# Patient Record
Sex: MALE | Race: OTHER | NOT HISPANIC OR LATINO
[De-identification: names, ages, dates, MRNs, and addresses within clinical notes are randomized per-mention and may not be internally consistent; named-entity substitution may affect disease eponyms.]

---

## 2020-01-15 PROBLEM — Z00.00 ENCOUNTER FOR PREVENTIVE HEALTH EXAMINATION: Status: ACTIVE | Noted: 2020-01-15

## 2020-01-23 ENCOUNTER — APPOINTMENT (OUTPATIENT)
Dept: ORTHOPEDIC SURGERY | Facility: CLINIC | Age: 55
End: 2020-01-23
Payer: OTHER MISCELLANEOUS

## 2020-01-23 VITALS
SYSTOLIC BLOOD PRESSURE: 145 MMHG | DIASTOLIC BLOOD PRESSURE: 99 MMHG | HEIGHT: 72 IN | HEART RATE: 101 BPM | BODY MASS INDEX: 32.51 KG/M2 | WEIGHT: 240 LBS

## 2020-01-23 DIAGNOSIS — Z02.6 ENCOUNTER FOR EXAMINATION FOR INSURANCE PURPOSES: ICD-10-CM

## 2020-01-23 DIAGNOSIS — T84.84XA PAIN DUE TO INTERNAL ORTHOPEDIC PROSTHETIC DEVICES, IMPLANTS AND GRAFTS, INITIAL ENCOUNTER: ICD-10-CM

## 2020-01-23 DIAGNOSIS — M25.552 PAIN IN LEFT HIP: ICD-10-CM

## 2020-01-23 DIAGNOSIS — Z87.81 PERSONAL HISTORY OF (HEALED) TRAUMATIC FRACTURE: ICD-10-CM

## 2020-01-23 PROCEDURE — 99203 OFFICE O/P NEW LOW 30 MIN: CPT

## 2020-01-23 PROCEDURE — 73502 X-RAY EXAM HIP UNI 2-3 VIEWS: CPT | Mod: LT

## 2020-06-25 ENCOUNTER — TRANSCRIPTION ENCOUNTER (OUTPATIENT)
Age: 55
End: 2020-06-25

## 2020-06-25 VITALS
DIASTOLIC BLOOD PRESSURE: 99 MMHG | WEIGHT: 262.13 LBS | OXYGEN SATURATION: 98 % | TEMPERATURE: 98 F | SYSTOLIC BLOOD PRESSURE: 152 MMHG | HEART RATE: 83 BPM | RESPIRATION RATE: 16 BRPM

## 2020-06-25 NOTE — H&P ADULT - NSHPPHYSICALEXAM_GEN_ALL_CORE
GENERAL:  PE:  Decreased ROM secondary to pain. Rest of PE per medical clearance. Gen: 55 y/o male, well nourished, well developed, NAD  MSK: Decreased ROM secondary to pain at the left hip   calves soft, nontender bilaterally   sensation intact to light touch bilateral lower extremities  DP 2+,  brisk capillary refill  EHL/TA 4/5, FHL/GS 4+/5 LLE  RLE within normal limits     Rest of PE per MD clearance

## 2020-06-25 NOTE — H&P ADULT - NSHPLABSRESULTS_GEN_ALL_CORE
Preop CBC, BMP within normal range  UA  DOS   PT/PTT/INR DOS  Preop EKG NSR and reviewed per medical clearance

## 2020-06-25 NOTE — H&P ADULT - HISTORY OF PRESENT ILLNESS
50yo m c/o left hip pain x   Presents today for elective REVISION LEFT THR. 52yo m c/o left hip pain. Patient reports he underwent primary total hip replacement in March of 2019 and has had pain, weakness in his left leg since. Patient reports symptoms have progressively worsened. He ambulates with cane for support. Patient reports failure of conservative management for hip pain including physical therapy, oral analgesics. Patient denies h/o blood clots, use of anticoagulants. He denies fever, chills, cough, SOB, recent sick contacts. Due to worsening pain at the left hip, patient presents today for elective revision left total hip replacement with Dr. Browne.

## 2020-06-25 NOTE — H&P ADULT - PROBLEM SELECTOR PLAN 1
Admit to Orthopaedic Service.  Presents today for elective REVISION LEFT THR.   Pt medically stable and cleared for procedure today by  Admit to Orthopaedic Service.  Presents today for elective revision left total hip replacement.   Pt medically stable and cleared for procedure today.

## 2020-06-26 ENCOUNTER — TRANSCRIPTION ENCOUNTER (OUTPATIENT)
Age: 55
End: 2020-06-26

## 2020-06-26 ENCOUNTER — INPATIENT (INPATIENT)
Facility: HOSPITAL | Age: 55
LOS: 3 days | Discharge: HOME | DRG: 468 | End: 2020-06-30
Attending: ORTHOPAEDIC SURGERY | Admitting: ORTHOPAEDIC SURGERY
Payer: OTHER MISCELLANEOUS

## 2020-06-26 ENCOUNTER — RESULT REVIEW (OUTPATIENT)
Age: 55
End: 2020-06-26

## 2020-06-26 DIAGNOSIS — M25.552 PAIN IN LEFT HIP: ICD-10-CM

## 2020-06-26 LAB
APPEARANCE UR: CLEAR — SIGNIFICANT CHANGE UP
APTT BLD: 32 SEC — SIGNIFICANT CHANGE UP (ref 27.5–36.3)
BILIRUB UR-MCNC: NEGATIVE — SIGNIFICANT CHANGE UP
COLOR SPEC: YELLOW — SIGNIFICANT CHANGE UP
DIFF PNL FLD: NEGATIVE — SIGNIFICANT CHANGE UP
GLUCOSE UR QL: NEGATIVE — SIGNIFICANT CHANGE UP
GRAM STN FLD: SIGNIFICANT CHANGE UP
INR BLD: 0.97 — SIGNIFICANT CHANGE UP (ref 0.88–1.16)
KETONES UR-MCNC: NEGATIVE — SIGNIFICANT CHANGE UP
LEUKOCYTE ESTERASE UR-ACNC: NEGATIVE — SIGNIFICANT CHANGE UP
NITRITE UR-MCNC: NEGATIVE — SIGNIFICANT CHANGE UP
PH UR: 6 — SIGNIFICANT CHANGE UP (ref 5–8)
PROT UR-MCNC: NEGATIVE MG/DL — SIGNIFICANT CHANGE UP
PROTHROM AB SERPL-ACNC: 11.1 SEC — SIGNIFICANT CHANGE UP (ref 10–12.9)
SP GR SPEC: 1.02 — SIGNIFICANT CHANGE UP (ref 1–1.03)
SPECIMEN SOURCE: SIGNIFICANT CHANGE UP
UROBILINOGEN FLD QL: 0.2 E.U./DL — SIGNIFICANT CHANGE UP

## 2020-06-26 PROCEDURE — 72170 X-RAY EXAM OF PELVIS: CPT | Mod: 26

## 2020-06-26 RX ORDER — ACETAMINOPHEN 500 MG
650 TABLET ORAL EVERY 6 HOURS
Refills: 0 | Status: DISCONTINUED | OUTPATIENT
Start: 2020-06-26 | End: 2020-06-30

## 2020-06-26 RX ORDER — OXYCODONE HYDROCHLORIDE 5 MG/1
5 TABLET ORAL EVERY 4 HOURS
Refills: 0 | Status: DISCONTINUED | OUTPATIENT
Start: 2020-06-26 | End: 2020-06-29

## 2020-06-26 RX ORDER — CEFAZOLIN SODIUM 1 G
2000 VIAL (EA) INJECTION EVERY 8 HOURS
Refills: 0 | Status: COMPLETED | OUTPATIENT
Start: 2020-06-26 | End: 2020-06-26

## 2020-06-26 RX ORDER — BUPIVACAINE 13.3 MG/ML
20 INJECTION, SUSPENSION, LIPOSOMAL INFILTRATION ONCE
Refills: 0 | Status: DISCONTINUED | OUTPATIENT
Start: 2020-06-26 | End: 2020-06-30

## 2020-06-26 RX ORDER — ASPIRIN/CALCIUM CARB/MAGNESIUM 324 MG
325 TABLET ORAL
Refills: 0 | Status: DISCONTINUED | OUTPATIENT
Start: 2020-06-27 | End: 2020-06-30

## 2020-06-26 RX ORDER — POLYETHYLENE GLYCOL 3350 17 G/17G
17 POWDER, FOR SOLUTION ORAL DAILY
Refills: 0 | Status: DISCONTINUED | OUTPATIENT
Start: 2020-06-26 | End: 2020-06-30

## 2020-06-26 RX ORDER — SENNA PLUS 8.6 MG/1
2 TABLET ORAL AT BEDTIME
Refills: 0 | Status: DISCONTINUED | OUTPATIENT
Start: 2020-06-26 | End: 2020-06-30

## 2020-06-26 RX ORDER — ONDANSETRON 8 MG/1
4 TABLET, FILM COATED ORAL EVERY 6 HOURS
Refills: 0 | Status: DISCONTINUED | OUTPATIENT
Start: 2020-06-26 | End: 2020-06-30

## 2020-06-26 RX ORDER — HYDROMORPHONE HYDROCHLORIDE 2 MG/ML
0.5 INJECTION INTRAMUSCULAR; INTRAVENOUS; SUBCUTANEOUS
Refills: 0 | Status: DISCONTINUED | OUTPATIENT
Start: 2020-06-26 | End: 2020-06-30

## 2020-06-26 RX ORDER — HYDROMORPHONE HYDROCHLORIDE 2 MG/ML
0.5 INJECTION INTRAMUSCULAR; INTRAVENOUS; SUBCUTANEOUS EVERY 4 HOURS
Refills: 0 | Status: DISCONTINUED | OUTPATIENT
Start: 2020-06-26 | End: 2020-06-30

## 2020-06-26 RX ORDER — PANTOPRAZOLE SODIUM 20 MG/1
40 TABLET, DELAYED RELEASE ORAL
Refills: 0 | Status: DISCONTINUED | OUTPATIENT
Start: 2020-06-26 | End: 2020-06-30

## 2020-06-26 RX ORDER — OXYCODONE HYDROCHLORIDE 5 MG/1
10 TABLET ORAL EVERY 4 HOURS
Refills: 0 | Status: DISCONTINUED | OUTPATIENT
Start: 2020-06-26 | End: 2020-06-29

## 2020-06-26 RX ORDER — ALBUMIN HUMAN 25 %
250 VIAL (ML) INTRAVENOUS
Refills: 0 | Status: DISCONTINUED | OUTPATIENT
Start: 2020-06-26 | End: 2020-06-29

## 2020-06-26 RX ORDER — SODIUM CHLORIDE 9 MG/ML
1000 INJECTION, SOLUTION INTRAVENOUS
Refills: 0 | Status: DISCONTINUED | OUTPATIENT
Start: 2020-06-26 | End: 2020-06-30

## 2020-06-26 RX ORDER — MAGNESIUM HYDROXIDE 400 MG/1
30 TABLET, CHEWABLE ORAL DAILY
Refills: 0 | Status: DISCONTINUED | OUTPATIENT
Start: 2020-06-26 | End: 2020-06-30

## 2020-06-26 RX ADMIN — OXYCODONE HYDROCHLORIDE 10 MILLIGRAM(S): 5 TABLET ORAL at 14:48

## 2020-06-26 RX ADMIN — HYDROMORPHONE HYDROCHLORIDE 0.5 MILLIGRAM(S): 2 INJECTION INTRAMUSCULAR; INTRAVENOUS; SUBCUTANEOUS at 12:45

## 2020-06-26 RX ADMIN — POLYETHYLENE GLYCOL 3350 17 GRAM(S): 17 POWDER, FOR SOLUTION ORAL at 14:48

## 2020-06-26 RX ADMIN — OXYCODONE HYDROCHLORIDE 10 MILLIGRAM(S): 5 TABLET ORAL at 19:29

## 2020-06-26 RX ADMIN — Medication 100 MILLIGRAM(S): at 23:32

## 2020-06-26 RX ADMIN — Medication 100 MILLIGRAM(S): at 15:48

## 2020-06-26 RX ADMIN — OXYCODONE HYDROCHLORIDE 10 MILLIGRAM(S): 5 TABLET ORAL at 23:29

## 2020-06-26 RX ADMIN — HYDROMORPHONE HYDROCHLORIDE 0.5 MILLIGRAM(S): 2 INJECTION INTRAMUSCULAR; INTRAVENOUS; SUBCUTANEOUS at 12:15

## 2020-06-26 NOTE — PROGRESS NOTE ADULT - SUBJECTIVE AND OBJECTIVE BOX
Orthopaedics Post Op Check    Procedure: left total hip revision  Surgeon: Dr. Browne     Pt comfortable, without complaints  Denies CP, SOB, N/V, numbness/tingling     Vital Signs Last 24 Hrs  T(C): 37.2 (26 Jun 2020 13:48), Max: 37.2 (26 Jun 2020 13:48)  T(F): 98.9 (26 Jun 2020 13:48), Max: 98.9 (26 Jun 2020 13:48)  HR: 97 (26 Jun 2020 13:48) (87 - 98)  BP: 128/89 (26 Jun 2020 13:48) (101/59 - 128/89)  BP(mean): 84 (26 Jun 2020 13:08) (74 - 84)  RR: 10 (26 Jun 2020 13:48) (10 - 24)  SpO2: 98% (26 Jun 2020 13:48) (96% - 100%)  AVSS, NAD    Dressing C/D/I; ishaan dressing in place   General: Pt Alert and oriented     Pulses: DP pulses 2+ bilaterally   Sensation: SLT in tact to distal bilateral lower extremities   Motor: EHL/FHL/TA/GS 5/5 right lower extremity; 4/5 left lower extremity (consistent with pre operative exam)          Post op XR:  left hip prosthesis in place     A/P: 54yMale POD#0 s/p left THR revision   - Stable  - Pain Control  - DVT ppx:  ASA, SCDs  - Post op abx: Ancef  - PT, WBS: WBAT  - F/U AM Labs

## 2020-06-26 NOTE — BRIEF OPERATIVE NOTE - NSICDXBRIEFPREOP_GEN_ALL_CORE_FT
PRE-OP DIAGNOSIS:  Pain due to total hip replacement 26-Jun-2020 11:49:24 and impingement of left hip Ivelisse Lechuga

## 2020-06-26 NOTE — PHYSICAL THERAPY INITIAL EVALUATION ADULT - GENERAL OBSERVATIONS, REHAB EVAL
Pt received semi supine, +L hip incision bandage C/D/I, +ELENA, +IV, +abduction pillow, +bilateral SCDs, NAD, agreeable to PT.

## 2020-06-26 NOTE — PHYSICAL THERAPY INITIAL EVALUATION ADULT - PERTINENT HX OF CURRENT PROBLEM, REHAB EVAL
52yo m c/o left hip pain. Patient reports he underwent primary total hip replacement in March of 2019 and has had pain, weakness in his left leg since.

## 2020-06-26 NOTE — DISCHARGE NOTE PROVIDER - CARE PROVIDER_API CALL
Jewel Browne  ORTHOPAEDIC SURGERY  [address] [place] [address]  Phone: [phone]  Fax: [phone]  Follow Up Time:

## 2020-06-26 NOTE — DISCHARGE NOTE PROVIDER - HOSPITAL COURSE
Admitted    Surgery Revision LEFT THR    Shirley-op Antibiotics    Pain control    DVT prophylaxis    OOB/Physical Therapy Admitted    Surgery Revision LEFT THRevision    Shirley-op Antibiotics    Pain control    DVT prophylaxis    OOB/Physical Therapy

## 2020-06-26 NOTE — DISCHARGE NOTE PROVIDER - NSDCCPCAREPLAN_GEN_ALL_CORE_FT
PRINCIPAL DISCHARGE DIAGNOSIS  Diagnosis: Left hip pain  Assessment and Plan of Treatment: Left hip pain

## 2020-06-26 NOTE — PHYSICAL THERAPY INITIAL EVALUATION ADULT - ADDITIONAL COMMENTS
Pt lives with his wife in a house with 3 steps to enter from the outside. Plans to stay on the 1st floor upon d/c home. At baseline ambulates independently with a SC. Pt owns RW, SC, commode, hip cushion and hip kit from previous surgery.

## 2020-06-26 NOTE — PHYSICAL THERAPY INITIAL EVALUATION ADULT - ACTIVE RANGE OF MOTION EXAMINATION, REHAB EVAL
LLE ROM testing limited due to pain/bilateral upper extremity Active ROM was WFL (within functional limits)/Right LE Active ROM was WFL (within functional limits)

## 2020-06-26 NOTE — BRIEF OPERATIVE NOTE - NSICDXBRIEFPROCEDURE_GEN_ALL_CORE_FT
PROCEDURES:  Open revision of liner in left hip joint 26-Jun-2020 11:48:11 revision of liner and ball Ivelisse Lechuga P

## 2020-06-26 NOTE — DISCHARGE NOTE PROVIDER - NSDCMRMEDTOKEN_GEN_ALL_CORE_FT
aspirin 325 mg oral delayed release tablet: 1 tab(s) orally 2 times a day MDD:2  oxyCODONE 5 mg oral tablet: 1 tab(s) orally every 4 hours, As needed, Severe Pain (7 - 10) MDD:6  senna oral tablet: 2 tab(s) orally once a day (at bedtime), As needed, Constipation  traMADol 50 mg oral tablet: 1 tab(s) orally every 4 hours, As needed, Moderate Pain (4 - 6) MDD:6

## 2020-06-26 NOTE — DISCHARGE NOTE PROVIDER - NSDCFUADDINST_GEN_ALL_CORE_FT
No strenuous activity, heavy lifting, driving or returning to work until cleared by MD.  You have a ELENA dressing. It is water resistant, but not waterproof. You may shower; however, the pump should be disconnected and placed in a safe location where it will not get wet.  No soaking in bathtubs.  The dressing has a battery that usually dies in 7 days. Once this occurs, you may remove the dressing and dispose of it, then leave incision open to air. Keep incision clean and dry.   Try to have regular bowel movements, take stool softener or laxative if necessary.  May take Pepcid or Zantac for upset stomach.    Swelling may travel all the way down leg to foot, this is normal and will subside in a few weeks.  Call to schedule an appt with Dr. Browne for follow up, if you have staples or sutures they will be removed in office.  Contact your doctor if you experience: fever greater than 101.5, chills, chest pain, difficulty breathing, redness or excessive drainage around the incision, other concerns.

## 2020-06-27 LAB
ANION GAP SERPL CALC-SCNC: 9 MMOL/L — SIGNIFICANT CHANGE UP (ref 5–17)
BUN SERPL-MCNC: 21 MG/DL — SIGNIFICANT CHANGE UP (ref 7–23)
CALCIUM SERPL-MCNC: 8.7 MG/DL — SIGNIFICANT CHANGE UP (ref 8.4–10.5)
CHLORIDE SERPL-SCNC: 105 MMOL/L — SIGNIFICANT CHANGE UP (ref 96–108)
CO2 SERPL-SCNC: 25 MMOL/L — SIGNIFICANT CHANGE UP (ref 22–31)
CREAT SERPL-MCNC: 0.95 MG/DL — SIGNIFICANT CHANGE UP (ref 0.5–1.3)
GLUCOSE SERPL-MCNC: 134 MG/DL — HIGH (ref 70–99)
HCT VFR BLD CALC: 38.6 % — LOW (ref 39–50)
HCV AB S/CO SERPL IA: 0.1 S/CO — SIGNIFICANT CHANGE UP
HCV AB SERPL-IMP: SIGNIFICANT CHANGE UP
HGB BLD-MCNC: 12.9 G/DL — LOW (ref 13–17)
MCHC RBC-ENTMCNC: 30.6 PG — SIGNIFICANT CHANGE UP (ref 27–34)
MCHC RBC-ENTMCNC: 33.4 GM/DL — SIGNIFICANT CHANGE UP (ref 32–36)
MCV RBC AUTO: 91.7 FL — SIGNIFICANT CHANGE UP (ref 80–100)
NRBC # BLD: 0 /100 WBCS — SIGNIFICANT CHANGE UP (ref 0–0)
PLATELET # BLD AUTO: 215 K/UL — SIGNIFICANT CHANGE UP (ref 150–400)
POTASSIUM SERPL-MCNC: 4.7 MMOL/L — SIGNIFICANT CHANGE UP (ref 3.5–5.3)
POTASSIUM SERPL-SCNC: 4.7 MMOL/L — SIGNIFICANT CHANGE UP (ref 3.5–5.3)
RBC # BLD: 4.21 M/UL — SIGNIFICANT CHANGE UP (ref 4.2–5.8)
RBC # FLD: 11.8 % — SIGNIFICANT CHANGE UP (ref 10.3–14.5)
SODIUM SERPL-SCNC: 139 MMOL/L — SIGNIFICANT CHANGE UP (ref 135–145)
WBC # BLD: 14 K/UL — HIGH (ref 3.8–10.5)
WBC # FLD AUTO: 14 K/UL — HIGH (ref 3.8–10.5)

## 2020-06-27 RX ADMIN — POLYETHYLENE GLYCOL 3350 17 GRAM(S): 17 POWDER, FOR SOLUTION ORAL at 11:56

## 2020-06-27 RX ADMIN — PANTOPRAZOLE SODIUM 40 MILLIGRAM(S): 20 TABLET, DELAYED RELEASE ORAL at 07:02

## 2020-06-27 RX ADMIN — OXYCODONE HYDROCHLORIDE 10 MILLIGRAM(S): 5 TABLET ORAL at 11:56

## 2020-06-27 RX ADMIN — OXYCODONE HYDROCHLORIDE 10 MILLIGRAM(S): 5 TABLET ORAL at 07:02

## 2020-06-27 RX ADMIN — Medication 325 MILLIGRAM(S): at 17:47

## 2020-06-27 RX ADMIN — OXYCODONE HYDROCHLORIDE 10 MILLIGRAM(S): 5 TABLET ORAL at 16:27

## 2020-06-27 RX ADMIN — OXYCODONE HYDROCHLORIDE 10 MILLIGRAM(S): 5 TABLET ORAL at 22:32

## 2020-06-27 RX ADMIN — HYDROMORPHONE HYDROCHLORIDE 0.5 MILLIGRAM(S): 2 INJECTION INTRAMUSCULAR; INTRAVENOUS; SUBCUTANEOUS at 17:50

## 2020-06-27 NOTE — PROGRESS NOTE ADULT - SUBJECTIVE AND OBJECTIVE BOX
Ortho Note    Pt comfortable without complaints, pain controlled  Denies CP, SOB, N/V, numbness/tingling     Vital Signs Last 24 Hrs  T(C): 36.4 (06-27-20 @ 04:46), Max: 36.4 (06-27-20 @ 04:46)  T(F): 97.6 (06-27-20 @ 04:46), Max: 97.6 (06-27-20 @ 04:46)  HR: 89 (06-27-20 @ 04:46) (89 - 89)  BP: 121/70 (06-27-20 @ 04:46) (121/70 - 121/70)  BP(mean): --  RR: 16 (06-27-20 @ 04:46) (16 - 16)  SpO2: 95% (06-27-20 @ 04:46) (95% - 95%)  AVSS    General: Pt Alert and oriented, NAD  L hip ishaan DSG C/D/I  Sensation intact s/s/sp/dp/t and symmetric   Motor: EHL/FHL/TA/GS 5/5 and symmetric   2+ DP/PT pulse   Toes WWP                          12.9   14.00 )-----------( 215      ( 27 Jun 2020 07:25 )             38.6   27 Jun 2020 07:25    139    |  105    |  21     ----------------------------<  134    4.7     |  25     |  0.95     Ca    8.7        27 Jun 2020 07:25        A/P: 54yMale POD#1 s/p L ASCENCION revision with change in head and liner 6/26  - Stable  - Pain Control  - DVT ppx: ASA  - PT, WBS: WBAT  - f/u OR culture  - dispo pending PT eval     Ortho Pager [phone]

## 2020-06-28 LAB
ANION GAP SERPL CALC-SCNC: 8 MMOL/L — SIGNIFICANT CHANGE UP (ref 5–17)
BUN SERPL-MCNC: 22 MG/DL — SIGNIFICANT CHANGE UP (ref 7–23)
CALCIUM SERPL-MCNC: 8.4 MG/DL — SIGNIFICANT CHANGE UP (ref 8.4–10.5)
CHLORIDE SERPL-SCNC: 103 MMOL/L — SIGNIFICANT CHANGE UP (ref 96–108)
CO2 SERPL-SCNC: 29 MMOL/L — SIGNIFICANT CHANGE UP (ref 22–31)
CREAT SERPL-MCNC: 1.07 MG/DL — SIGNIFICANT CHANGE UP (ref 0.5–1.3)
GLUCOSE SERPL-MCNC: 96 MG/DL — SIGNIFICANT CHANGE UP (ref 70–99)
HCT VFR BLD CALC: 38.1 % — LOW (ref 39–50)
HGB BLD-MCNC: 12.4 G/DL — LOW (ref 13–17)
MCHC RBC-ENTMCNC: 30.2 PG — SIGNIFICANT CHANGE UP (ref 27–34)
MCHC RBC-ENTMCNC: 32.5 GM/DL — SIGNIFICANT CHANGE UP (ref 32–36)
MCV RBC AUTO: 92.7 FL — SIGNIFICANT CHANGE UP (ref 80–100)
NRBC # BLD: 0 /100 WBCS — SIGNIFICANT CHANGE UP (ref 0–0)
PLATELET # BLD AUTO: 197 K/UL — SIGNIFICANT CHANGE UP (ref 150–400)
POTASSIUM SERPL-MCNC: 4.6 MMOL/L — SIGNIFICANT CHANGE UP (ref 3.5–5.3)
POTASSIUM SERPL-SCNC: 4.6 MMOL/L — SIGNIFICANT CHANGE UP (ref 3.5–5.3)
RBC # BLD: 4.11 M/UL — LOW (ref 4.2–5.8)
RBC # FLD: 12.2 % — SIGNIFICANT CHANGE UP (ref 10.3–14.5)
SODIUM SERPL-SCNC: 140 MMOL/L — SIGNIFICANT CHANGE UP (ref 135–145)
WBC # BLD: 9.5 K/UL — SIGNIFICANT CHANGE UP (ref 3.8–10.5)
WBC # FLD AUTO: 9.5 K/UL — SIGNIFICANT CHANGE UP (ref 3.8–10.5)

## 2020-06-28 RX ORDER — KETOROLAC TROMETHAMINE 30 MG/ML
15 SYRINGE (ML) INJECTION ONCE
Refills: 0 | Status: DISCONTINUED | OUTPATIENT
Start: 2020-06-28 | End: 2020-06-28

## 2020-06-28 RX ADMIN — POLYETHYLENE GLYCOL 3350 17 GRAM(S): 17 POWDER, FOR SOLUTION ORAL at 10:30

## 2020-06-28 RX ADMIN — OXYCODONE HYDROCHLORIDE 10 MILLIGRAM(S): 5 TABLET ORAL at 06:35

## 2020-06-28 RX ADMIN — OXYCODONE HYDROCHLORIDE 10 MILLIGRAM(S): 5 TABLET ORAL at 15:21

## 2020-06-28 RX ADMIN — Medication 325 MILLIGRAM(S): at 17:16

## 2020-06-28 RX ADMIN — PANTOPRAZOLE SODIUM 40 MILLIGRAM(S): 20 TABLET, DELAYED RELEASE ORAL at 06:35

## 2020-06-28 RX ADMIN — OXYCODONE HYDROCHLORIDE 10 MILLIGRAM(S): 5 TABLET ORAL at 21:34

## 2020-06-28 RX ADMIN — Medication 650 MILLIGRAM(S): at 10:26

## 2020-06-28 RX ADMIN — Medication 325 MILLIGRAM(S): at 06:35

## 2020-06-28 RX ADMIN — OXYCODONE HYDROCHLORIDE 10 MILLIGRAM(S): 5 TABLET ORAL at 10:27

## 2020-06-28 NOTE — PROGRESS NOTE ADULT - SUBJECTIVE AND OBJECTIVE BOX
Ortho Note    Pt comfortable without complaints, pain controlled  Denies CP, SOB, N/V, numbness/tingling     Vital Signs Last 24 Hrs  T(C): 36.8 (06-28-20 @ 06:30), Max: 36.8 (06-28-20 @ 06:30)  T(F): 98.3 (06-28-20 @ 06:30), Max: 98.3 (06-28-20 @ 06:30)  HR: 86 (06-28-20 @ 06:30) (86 - 86)  BP: 125/81 (06-28-20 @ 06:30) (125/81 - 125/81)  BP(mean): --  RR: 16 (06-28-20 @ 06:30) (16 - 16)  SpO2: 96% (06-28-20 @ 06:30) (96% - 96%)    General: Pt Alert and oriented, NAD  L hip ishaan DSG C/D/I  Sensation intact s/s/sp/dp/t and symmetric   Motor: EHL/FHL/TA/GS 5/5 and symmetric   2+ DP/PT pulse   Toes WWP                          12.4   9.50  )-----------( 197      ( 28 Jun 2020 06:00 )             38.1   28 Jun 2020 06:00    140    |  103    |  22     ----------------------------<  96     4.6     |  29     |  1.07     Ca    8.4        28 Jun 2020 06:00        A/P: 54yMale s/p L ASCENCION revision with change in head and liner 6/26  - Stable  - Pain Control  - DVT ppx: ASA  - PT, WBS: WBAT  - f/u OR culture  - dispo home today if clears PT    Ortho Pager [phone]

## 2020-06-29 LAB
ANION GAP SERPL CALC-SCNC: 9 MMOL/L — SIGNIFICANT CHANGE UP (ref 5–17)
BUN SERPL-MCNC: 19 MG/DL — SIGNIFICANT CHANGE UP (ref 7–23)
CALCIUM SERPL-MCNC: 8.4 MG/DL — SIGNIFICANT CHANGE UP (ref 8.4–10.5)
CHLORIDE SERPL-SCNC: 103 MMOL/L — SIGNIFICANT CHANGE UP (ref 96–108)
CO2 SERPL-SCNC: 29 MMOL/L — SIGNIFICANT CHANGE UP (ref 22–31)
CR SERPL-MCNC: 0.9 UG/L — SIGNIFICANT CHANGE UP (ref 0.1–2.1)
CREAT SERPL-MCNC: 0.98 MG/DL — SIGNIFICANT CHANGE UP (ref 0.5–1.3)
GLUCOSE SERPL-MCNC: 93 MG/DL — SIGNIFICANT CHANGE UP (ref 70–99)
HCT VFR BLD CALC: 39.8 % — SIGNIFICANT CHANGE UP (ref 39–50)
HGB BLD-MCNC: 12.9 G/DL — LOW (ref 13–17)
MCHC RBC-ENTMCNC: 30.4 PG — SIGNIFICANT CHANGE UP (ref 27–34)
MCHC RBC-ENTMCNC: 32.4 GM/DL — SIGNIFICANT CHANGE UP (ref 32–36)
MCV RBC AUTO: 93.6 FL — SIGNIFICANT CHANGE UP (ref 80–100)
NRBC # BLD: 0 /100 WBCS — SIGNIFICANT CHANGE UP (ref 0–0)
PLATELET # BLD AUTO: 199 K/UL — SIGNIFICANT CHANGE UP (ref 150–400)
POTASSIUM SERPL-MCNC: 4 MMOL/L — SIGNIFICANT CHANGE UP (ref 3.5–5.3)
POTASSIUM SERPL-SCNC: 4 MMOL/L — SIGNIFICANT CHANGE UP (ref 3.5–5.3)
RBC # BLD: 4.25 M/UL — SIGNIFICANT CHANGE UP (ref 4.2–5.8)
RBC # FLD: 12 % — SIGNIFICANT CHANGE UP (ref 10.3–14.5)
SODIUM SERPL-SCNC: 141 MMOL/L — SIGNIFICANT CHANGE UP (ref 135–145)
WBC # BLD: 7.85 K/UL — SIGNIFICANT CHANGE UP (ref 3.8–10.5)
WBC # FLD AUTO: 7.85 K/UL — SIGNIFICANT CHANGE UP (ref 3.8–10.5)

## 2020-06-29 RX ORDER — TRAMADOL HYDROCHLORIDE 50 MG/1
50 TABLET ORAL EVERY 4 HOURS
Refills: 0 | Status: DISCONTINUED | OUTPATIENT
Start: 2020-06-29 | End: 2020-06-30

## 2020-06-29 RX ORDER — OXYCODONE HYDROCHLORIDE 5 MG/1
5 TABLET ORAL EVERY 4 HOURS
Refills: 0 | Status: DISCONTINUED | OUTPATIENT
Start: 2020-06-29 | End: 2020-06-30

## 2020-06-29 RX ADMIN — POLYETHYLENE GLYCOL 3350 17 GRAM(S): 17 POWDER, FOR SOLUTION ORAL at 14:14

## 2020-06-29 RX ADMIN — OXYCODONE HYDROCHLORIDE 10 MILLIGRAM(S): 5 TABLET ORAL at 09:40

## 2020-06-29 RX ADMIN — Medication 325 MILLIGRAM(S): at 05:40

## 2020-06-29 RX ADMIN — PANTOPRAZOLE SODIUM 40 MILLIGRAM(S): 20 TABLET, DELAYED RELEASE ORAL at 05:40

## 2020-06-29 RX ADMIN — Medication 325 MILLIGRAM(S): at 18:03

## 2020-06-29 RX ADMIN — OXYCODONE HYDROCHLORIDE 10 MILLIGRAM(S): 5 TABLET ORAL at 05:40

## 2020-06-29 NOTE — PROGRESS NOTE ADULT - SUBJECTIVE AND OBJECTIVE BOX
STATUS POST: Left hip revision                 SUBJECTIVE: Patient seen and examined. States to still having pain to left hip. Gets light headed when trying to ambulate with PT. States to having left foot weakness prior to surgery which is consistent with todays exam.     Pain:  well controlled      OBJECTIVE:     Vital Signs Last 24 Hrs  T(C): 36.4 (29 Jun 2020 09:50), Max: 37.1 (28 Jun 2020 15:55)  T(F): 97.5 (29 Jun 2020 09:50), Max: 98.8 (28 Jun 2020 15:55)  HR: 85 (29 Jun 2020 09:50) (78 - 98)  BP: 142/91 (29 Jun 2020 09:50) (116/77 - 151/84)  BP(mean): --  RR: 15 (29 Jun 2020 09:50) (15 - 18)  SpO2: 96% (29 Jun 2020 09:50) (96% - 98%)    Affected extremity:          Dressing: clean/dry/intact          Sensation: intact to light touch          Motor exam: right LE: 5/5 to EHL/TA/GS, LLE: EHL/TA 4/5, FHL/GS 4+/5 LLE   warm, well-perfused; capillary refill < 3 seconds              I&O's Detail    28 Jun 2020 07:01  -  29 Jun 2020 07:00  --------------------------------------------------------  IN:    Oral Fluid: 480 mL  Total IN: 480 mL    OUT:    Voided: 1500 mL  Total OUT: 1500 mL    Total NET: -1020 mL          LABS:                        12.9   7.85  )-----------( 199      ( 29 Jun 2020 06:37 )             39.8     06-29    141  |  103  |  19  ----------------------------<  93  4.0   |  29  |  0.98    Ca    8.4      29 Jun 2020 06:37            MEDICATIONS:    acetaminophen   Tablet .. 650 milliGRAM(s) Oral every 6 hours PRN  HYDROmorphone  Injectable 0.5 milliGRAM(s) IV Push every 15 minutes PRN  HYDROmorphone  Injectable 0.5 milliGRAM(s) IV Push every 4 hours PRN  ondansetron Injectable 4 milliGRAM(s) IV Push every 6 hours PRN  oxyCODONE    IR 5 milliGRAM(s) Oral every 4 hours PRN  oxyCODONE    IR 10 milliGRAM(s) Oral every 4 hours PRN    aspirin enteric coated 325 milliGRAM(s) Oral two times a day      RADIOLOGY & ADDITIONAL STUDIES:    ASSESSMENT AND PLAN: s/p left hip revision    1. Analgesic pain control  2. DVT prophylaxis: ASA         SCDs       3. Continue PT, posterior hip precautions  4. Weight Bearing Status:  Weight bearing as tolerated      5. Disposition: Pending PT progress

## 2020-06-29 NOTE — OCCUPATIONAL THERAPY INITIAL EVALUATION ADULT - RANGE OF MOTION EXAMINATION, LOWER EXTREMITY
Right LE Active ROM was WFL   (within functional limits)/Left hip limited due to surgical precautions; L knee and ankle AROM WFL

## 2020-06-29 NOTE — OCCUPATIONAL THERAPY INITIAL EVALUATION ADULT - PERTINENT HX OF CURRENT PROBLEM, REHAB EVAL
Pt 52yo m c/o left hip pain. Patient reports he underwent primary total hip replacement in March of 2019 and has had pain, weakness in his left leg since. Patient reports symptoms have progressively worsened. Patient reports failure of conservative management for hip pain including physical therapy, oral analgesics. Pt is now s/p left hip revision

## 2020-06-29 NOTE — OCCUPATIONAL THERAPY INITIAL EVALUATION ADULT - DIAGNOSIS, OT EVAL
t presents s/p L ASCENCION with adequate knowledge of posterior hip precautions, pain at left hip, deficits in strength, functional balance, activity tolerance, resulting in decreased ADLs/functional mobility.

## 2020-06-29 NOTE — PROGRESS NOTE ADULT - SUBJECTIVE AND OBJECTIVE BOX
Pt endorses pain in L hip and weakness in L leg secondary to pain. No acute events overnight. Denies CP, SOB, N/V, numbness/tingling     Vital Signs Last 24 Hrs  T(C): 36.8 (29 Jun 2020 05:00), Max: 37.1 (28 Jun 2020 15:55)  T(F): 98.3 (29 Jun 2020 05:00), Max: 98.8 (28 Jun 2020 15:55)  HR: 85 (29 Jun 2020 05:00) (78 - 105)  BP: 151/84 (29 Jun 2020 05:00) (102/69 - 151/84)  RR: 16 (29 Jun 2020 05:00) (16 - 18)  SpO2: 98% (29 Jun 2020 05:00) (96% - 98%)      General: Pt Alert and oriented, NAD  LLE: L hip ishaan DSG C/D/I. SILT. TA/EHL/FHL/GS motors 4-/5, pt states secondary to pain, stable from postop. WWP  RLE: SILT,  EHL/FHL/TA/GS. WWP                          12.9   7.85  )-----------( 199      ( 29 Jun 2020 06:37 )             39.8     06-29    141  |  103  |  19  ----------------------------<  93  4.0   |  29  |  0.98    Ca    8.4      29 Jun 2020 06:37           A/P: 54yMale s/p L ASCENCION revision with change in head and liner 6/26  - Stable  - Pain Control  - DVT ppx: ASA  - PT, WBS: WBAT  - f/u OR cultures-- NGTD 6/29  - dispo home when clears PT    Ortho Pager [phone]

## 2020-06-30 ENCOUNTER — TRANSCRIPTION ENCOUNTER (OUTPATIENT)
Age: 55
End: 2020-06-30

## 2020-06-30 VITALS — SYSTOLIC BLOOD PRESSURE: 157 MMHG | DIASTOLIC BLOOD PRESSURE: 84 MMHG

## 2020-06-30 LAB
ANION GAP SERPL CALC-SCNC: 10 MMOL/L — SIGNIFICANT CHANGE UP (ref 5–17)
BUN SERPL-MCNC: 17 MG/DL — SIGNIFICANT CHANGE UP (ref 7–23)
CALCIUM SERPL-MCNC: 8.7 MG/DL — SIGNIFICANT CHANGE UP (ref 8.4–10.5)
CHLORIDE SERPL-SCNC: 102 MMOL/L — SIGNIFICANT CHANGE UP (ref 96–108)
CO2 SERPL-SCNC: 26 MMOL/L — SIGNIFICANT CHANGE UP (ref 22–31)
CREAT SERPL-MCNC: 1.04 MG/DL — SIGNIFICANT CHANGE UP (ref 0.5–1.3)
CULTURE RESULTS: NO GROWTH — SIGNIFICANT CHANGE UP
GLUCOSE SERPL-MCNC: 100 MG/DL — HIGH (ref 70–99)
HCT VFR BLD CALC: 39.7 % — SIGNIFICANT CHANGE UP (ref 39–50)
HGB BLD-MCNC: 13.3 G/DL — SIGNIFICANT CHANGE UP (ref 13–17)
MCHC RBC-ENTMCNC: 30.4 PG — SIGNIFICANT CHANGE UP (ref 27–34)
MCHC RBC-ENTMCNC: 33.5 GM/DL — SIGNIFICANT CHANGE UP (ref 32–36)
MCV RBC AUTO: 90.6 FL — SIGNIFICANT CHANGE UP (ref 80–100)
NRBC # BLD: 0 /100 WBCS — SIGNIFICANT CHANGE UP (ref 0–0)
PLATELET # BLD AUTO: 210 K/UL — SIGNIFICANT CHANGE UP (ref 150–400)
POTASSIUM SERPL-MCNC: 4.6 MMOL/L — SIGNIFICANT CHANGE UP (ref 3.5–5.3)
POTASSIUM SERPL-SCNC: 4.6 MMOL/L — SIGNIFICANT CHANGE UP (ref 3.5–5.3)
RBC # BLD: 4.38 M/UL — SIGNIFICANT CHANGE UP (ref 4.2–5.8)
RBC # FLD: 11.8 % — SIGNIFICANT CHANGE UP (ref 10.3–14.5)
SODIUM SERPL-SCNC: 138 MMOL/L — SIGNIFICANT CHANGE UP (ref 135–145)
SPECIMEN SOURCE: SIGNIFICANT CHANGE UP
WBC # BLD: 7.68 K/UL — SIGNIFICANT CHANGE UP (ref 3.8–10.5)
WBC # FLD AUTO: 7.68 K/UL — SIGNIFICANT CHANGE UP (ref 3.8–10.5)

## 2020-06-30 PROCEDURE — 87205 SMEAR GRAM STAIN: CPT

## 2020-06-30 PROCEDURE — 80048 BASIC METABOLIC PNL TOTAL CA: CPT

## 2020-06-30 PROCEDURE — 72170 X-RAY EXAM OF PELVIS: CPT

## 2020-06-30 PROCEDURE — 81003 URINALYSIS AUTO W/O SCOPE: CPT

## 2020-06-30 PROCEDURE — 86803 HEPATITIS C AB TEST: CPT

## 2020-06-30 PROCEDURE — 86901 BLOOD TYPING SEROLOGIC RH(D): CPT

## 2020-06-30 PROCEDURE — 86850 RBC ANTIBODY SCREEN: CPT

## 2020-06-30 PROCEDURE — 36415 COLL VENOUS BLD VENIPUNCTURE: CPT

## 2020-06-30 PROCEDURE — 82495 ASSAY OF CHROMIUM: CPT

## 2020-06-30 PROCEDURE — 97161 PT EVAL LOW COMPLEX 20 MIN: CPT

## 2020-06-30 PROCEDURE — 83018 HEAVY METAL QUAN EACH NES: CPT

## 2020-06-30 PROCEDURE — 97110 THERAPEUTIC EXERCISES: CPT

## 2020-06-30 PROCEDURE — 97116 GAIT TRAINING THERAPY: CPT

## 2020-06-30 PROCEDURE — 85610 PROTHROMBIN TIME: CPT

## 2020-06-30 PROCEDURE — 87075 CULTR BACTERIA EXCEPT BLOOD: CPT

## 2020-06-30 PROCEDURE — 85730 THROMBOPLASTIN TIME PARTIAL: CPT

## 2020-06-30 PROCEDURE — 88311 DECALCIFY TISSUE: CPT

## 2020-06-30 PROCEDURE — 88304 TISSUE EXAM BY PATHOLOGIST: CPT

## 2020-06-30 PROCEDURE — 87070 CULTURE OTHR SPECIMN AEROBIC: CPT

## 2020-06-30 PROCEDURE — 99253 IP/OBS CNSLTJ NEW/EST LOW 45: CPT

## 2020-06-30 PROCEDURE — C1776: CPT

## 2020-06-30 PROCEDURE — 85027 COMPLETE CBC AUTOMATED: CPT

## 2020-06-30 PROCEDURE — 88305 TISSUE EXAM BY PATHOLOGIST: CPT

## 2020-06-30 RX ORDER — TRAMADOL HYDROCHLORIDE 50 MG/1
1 TABLET ORAL
Qty: 42 | Refills: 0
Start: 2020-06-30 | End: 2020-07-06

## 2020-06-30 RX ORDER — SENNA PLUS 8.6 MG/1
2 TABLET ORAL
Qty: 0 | Refills: 0 | DISCHARGE
Start: 2020-06-30

## 2020-06-30 RX ORDER — ASPIRIN/CALCIUM CARB/MAGNESIUM 324 MG
1 TABLET ORAL
Qty: 56 | Refills: 0
Start: 2020-06-30 | End: 2020-07-27

## 2020-06-30 RX ORDER — OXYCODONE HYDROCHLORIDE 5 MG/1
1 TABLET ORAL
Qty: 24 | Refills: 0
Start: 2020-06-30 | End: 2020-07-03

## 2020-06-30 RX ORDER — TRAMADOL HYDROCHLORIDE 50 MG/1
1 TABLET ORAL
Qty: 30 | Refills: 0
Start: 2020-06-30 | End: 2020-07-04

## 2020-06-30 RX ADMIN — OXYCODONE HYDROCHLORIDE 5 MILLIGRAM(S): 5 TABLET ORAL at 05:07

## 2020-06-30 RX ADMIN — Medication 325 MILLIGRAM(S): at 05:07

## 2020-06-30 RX ADMIN — PANTOPRAZOLE SODIUM 40 MILLIGRAM(S): 20 TABLET, DELAYED RELEASE ORAL at 05:07

## 2020-06-30 RX ADMIN — TRAMADOL HYDROCHLORIDE 50 MILLIGRAM(S): 50 TABLET ORAL at 09:58

## 2020-06-30 NOTE — CONSULT NOTE ADULT - ASSESSMENT
51M w L hip pain s/p THR in 03/2019 c/b progressive pain and weakness here for revision L THR 2/26, course c/b lightheadedness with PT - likely 2/2 medications, now improving    #Post-op state - pain controlled in L hip, on bowel regimen, IS at bedside, PPx: SQL  #Orthostatic hypotension - subjectively improved after decreasing pain regimen (DC oxy 10 and IV dilaudid).     Recommendations  --Symptoms overall appear to be improved. Went from supine to sit to stand w minimal exacerbation symptoms on my examination  --Likely due to medication w possibly decreased PO intake earlier in admission  --Agree w keeping current regimen w oxycodone 5 and tramadol which is also controlling pain  --From medical standpoint, pt is optimized for disposition    Dispo: home w HPT pending final PT clearance yes...

## 2020-06-30 NOTE — CHART NOTE - NSCHARTNOTEFT_GEN_A_CORE
Admitting Diagnosis:   Patient is a 54y old  Male who presents with a chief complaint of left hip pain (30 Jun 2020 10:45)    Consult: Yes [   ]  No [ X ]    Reason for Initial Nutrition Assessment: LOS    Current Nutrition Order: Regular diet    PO Intake: Good (%) [ X ]  Fair (50-75%) [   ] Poor (<25%) [   ]    GI Issues: no N/V, last BM 6/29 per pt    Pain: pt endorses some pain    Skin Integrity: Lalit score 20    Labs:   06-30    138  |  102  |  17  ----------------------------<  100<H>  4.6   |  26  |  1.04    Ca    8.7      30 Jun 2020 06:08    Nutritionally Pertinent Lab Values:  6/30: Glu 100    Medications:  MEDICATIONS  (STANDING):  aspirin enteric coated 325 milliGRAM(s) Oral two times a day  BUpivacaine liposome 1.3% Injectable (no eMAR) 20 milliLiter(s) Local Injection once  lactated ringers. 1000 milliLiter(s) (100 mL/Hr) IV Continuous <Continuous>  pantoprazole    Tablet 40 milliGRAM(s) Oral before breakfast  polyethylene glycol 3350 17 Gram(s) Oral daily    MEDICATIONS  (PRN):  acetaminophen   Tablet .. 650 milliGRAM(s) Oral every 6 hours PRN Temp greater or equal to 38C (100.4F), Mild Pain (1 - 3)  aluminum hydroxide/magnesium hydroxide/simethicone Suspension 30 milliLiter(s) Oral four times a day PRN Indigestion  bisacodyl Suppository 10 milliGRAM(s) Rectal daily PRN If no bowel movement by POD#2  HYDROmorphone  Injectable 0.5 milliGRAM(s) IV Push every 15 minutes PRN Severe Pain (7 - 10)  HYDROmorphone  Injectable 0.5 milliGRAM(s) IV Push every 4 hours PRN Breakthrough pain  magnesium hydroxide Suspension 30 milliLiter(s) Oral daily PRN Constipation  ondansetron Injectable 4 milliGRAM(s) IV Push every 6 hours PRN Nausea and/or Vomiting  oxyCODONE    IR 5 milliGRAM(s) Oral every 4 hours PRN Severe Pain (7 - 10)  senna 2 Tablet(s) Oral at bedtime PRN Constipation  traMADol 50 milliGRAM(s) Oral every 4 hours PRN Moderate Pain (4 - 6)    Admitted Anthropometrics:  Ht (6/30 per pt): 182.9cm, Wt (6/30 per pt): 109.1kg, IBW: 81kg+/-10%, %IBW: 134%, BMI: 32.5    Nutrition Focused Physical Exam: Completed [   ]  Unable to complete [   ]-N/A    Estimated energy needs:  IBW (81kg) used to calculate energy needs due to pt's current body weight exceeding 120% of IBW.  Needs adjusted for overweight status, post-op.    7054-2718 kcal (25-30 kcal/kg IBW)  97-113gm protein (1.2-1.4gm/kg IBW)  2025-2430mL (25-30 mL/kg IBW)    Subjective: 54yMale no significant PMHx, p/w left hip pain. Pt reports he underwent primary total hip replacement in March of 2019 and has had pain, weakness in his left leg since. Pt reports symptoms have progressively worsened and reports failure of conservative management for hip pain including physical therapy, oral analgesics. Now s/p L ASCENCION revision with change in head and liner 6/26. Pt resting in bed on assessment. Awaiting clearance from PT for home. Pt states appetite good now and PTA, follows a regular diet, denies food allergies. Pt states UBW is ~240lb, denies recent weight changes PTA. Of note, weight documented is 262lb on 6/26, bed scale not working at this time. RD to monitor and f/u per protocol.    Nutrition Diagnosis:  Increased nutrient needs RT increased demand for protein AEB post-op    Goal: Pt to meet >/=75%EER PO with good tolerance    Recommendations:  1. Recommend continue regular diet as tolerated  2. Monitor labs (BMP, lytes); replete lytes prn  3. Trend weekly weights     Education: Increased nutritional needs post-op, adequate lean protein intake for healing post-op; pt appeared receptive    Risk Level: High [   ] Moderate [ X  ]  Low [   ]

## 2020-06-30 NOTE — PROGRESS NOTE ADULT - SUBJECTIVE AND OBJECTIVE BOX
POST OPERATIVE DAY #: 3  STATUS POST: Left THRevision    SUBJECTIVE: Patient seen and examined. Pt. has been getting dizzy with PT, will try to use Tramadol instead of oxycodone 2/2 to pain issues. Denies any sob/cp/n/v/numbness or tingling in b/l les.     OBJECTIVE:     Vital Signs Last 24 Hrs  T(C): 36.6 (30 Jun 2020 04:57), Max: 36.8 (29 Jun 2020 16:20)  T(F): 97.9 (30 Jun 2020 04:57), Max: 98.2 (29 Jun 2020 16:20)  HR: 85 (30 Jun 2020 04:57) (85 - 88)  BP: 147/92 (30 Jun 2020 04:57) (116/85 - 165/100)  BP(mean): --  RR: 16 (30 Jun 2020 04:57) (16 - 16)  SpO2: 94% (30 Jun 2020 04:57) (94% - 95%)    Affected extremity: left le          Dressing: clean/dry/intact ishaan          Sensation: intact to light touch to patient's baseline decreased in left le          Motor exam: EHL/TA/GS 4/5 left le, 5/5 right le  Pulses 2+             I&O's Detail    29 Jun 2020 07:01  -  30 Jun 2020 07:00  --------------------------------------------------------  IN:    Oral Fluid: 780 mL  Total IN: 780 mL    OUT:    Voided: 2000 mL  Total OUT: 2000 mL    Total NET: -1220 mL          LABS:                        13.3   7.68  )-----------( 210      ( 30 Jun 2020 06:08 )             39.7     06-30    138  |  102  |  17  ----------------------------<  100<H>  4.6   |  26  |  1.04    Ca    8.7      30 Jun 2020 06:08            MEDICATIONS:    acetaminophen   Tablet .. 650 milliGRAM(s) Oral every 6 hours PRN  HYDROmorphone  Injectable 0.5 milliGRAM(s) IV Push every 15 minutes PRN  HYDROmorphone  Injectable 0.5 milliGRAM(s) IV Push every 4 hours PRN  ondansetron Injectable 4 milliGRAM(s) IV Push every 6 hours PRN  oxyCODONE    IR 5 milliGRAM(s) Oral every 4 hours PRN  traMADol 50 milliGRAM(s) Oral every 4 hours PRN    aspirin enteric coated 325 milliGRAM(s) Oral two times a day        ASSESSMENT AND PLAN: 53yo Male s/p LEFT THRevision    1. Analgesic pain control- will try Tramadol 50mg today for PT to control pain and lessen dizziness  2. DVT prophylaxis: ASA       3. Weight Bearing Status:  Weight bearing as tolerated    4. Disposition: Home pending PT clearance

## 2020-06-30 NOTE — CONSULT NOTE ADULT - SUBJECTIVE AND OBJECTIVE BOX
51M w L hip pain s/p THR in 03/2019 c/b progressive pain and weakness here for revision L THR 2/26, course c/b lightheadedness with PT      Allergies:  FHx  SH:      50yo m c/o left hip pain. Patient reports he underwent primary total hip replacement in March of 2019 and has had pain, weakness in his left leg since. Patient reports symptoms have progressively worsened. He ambulates with cane for support. Patient reports failure of conservative management for hip pain including physical therapy, oral analgesics. Patient denies h/o blood clots, use of anticoagulants. He denies fever, chills, cough, SOB, recent sick contacts. Due to worsening pain at the left hip, patient presents today for elective revision left total hip replacement with Dr. Browne. (25 Jun 2020 14:26)      PAST MEDICAL & SURGICAL HISTORY:    Home Meds: Home Medications:  senna oral tablet: 2 tab(s) orally once a day (at bedtime), As needed, Constipation (30 Jun 2020 12:00)    Allergies: Allergies    No Known Allergies    Intolerances      Soc:   Advanced Directives: Presumed Full Code     CURRENT MEDICATIONS:   --------------------------------------------------------------------------------------  Neurologic Medications  acetaminophen   Tablet .. 650 milliGRAM(s) Oral every 6 hours PRN Temp greater or equal to 38C (100.4F), Mild Pain (1 - 3)  HYDROmorphone  Injectable 0.5 milliGRAM(s) IV Push every 15 minutes PRN Severe Pain (7 - 10)  HYDROmorphone  Injectable 0.5 milliGRAM(s) IV Push every 4 hours PRN Breakthrough pain  ondansetron Injectable 4 milliGRAM(s) IV Push every 6 hours PRN Nausea and/or Vomiting  oxyCODONE    IR 5 milliGRAM(s) Oral every 4 hours PRN Severe Pain (7 - 10)  traMADol 50 milliGRAM(s) Oral every 4 hours PRN Moderate Pain (4 - 6)    Respiratory Medications    Cardiovascular Medications    Gastrointestinal Medications  aluminum hydroxide/magnesium hydroxide/simethicone Suspension 30 milliLiter(s) Oral four times a day PRN Indigestion  bisacodyl Suppository 10 milliGRAM(s) Rectal daily PRN If no bowel movement by POD#2  lactated ringers. 1000 milliLiter(s) IV Continuous <Continuous>  magnesium hydroxide Suspension 30 milliLiter(s) Oral daily PRN Constipation  pantoprazole    Tablet 40 milliGRAM(s) Oral before breakfast  polyethylene glycol 3350 17 Gram(s) Oral daily  senna 2 Tablet(s) Oral at bedtime PRN Constipation    Genitourinary Medications    Hematologic/Oncologic Medications  aspirin enteric coated 325 milliGRAM(s) Oral two times a day    Antimicrobial/Immunologic Medications    Endocrine/Metabolic Medications    Topical/Other Medications  BUpivacaine liposome 1.3% Injectable (no eMAR) 20 milliLiter(s) Local Injection once    --------------------------------------------------------------------------------------    VITAL SIGNS, INS/OUTS (last 24 hours):  --------------------------------------------------------------------------------------  ICU Vital Signs Last 24 Hrs  T(C): 37.1 (30 Jun 2020 08:28), Max: 37.1 (30 Jun 2020 08:28)  T(F): 98.7 (30 Jun 2020 08:28), Max: 98.7 (30 Jun 2020 08:28)  HR: 70 (30 Jun 2020 08:28) (70 - 88)  BP: 127/68 (30 Jun 2020 08:28) (118/79 - 165/100)  BP(mean): --  ABP: --  ABP(mean): --  RR: 15 (30 Jun 2020 08:28) (15 - 16)  SpO2: 99% (30 Jun 2020 08:28) (94% - 99%)    I&O's Summary    29 Jun 2020 07:01  -  30 Jun 2020 07:00  --------------------------------------------------------  IN: 780 mL / OUT: 2000 mL / NET: -1220 mL      --------------------------------------------------------------------------------------    EXAM:      LABS  --------------------------------------------------------------------------------------  Labs:  CAPILLARY BLOOD GLUCOSE                              13.3   7.68  )-----------( 210      ( 30 Jun 2020 06:08 )             39.7         06-30    138  |  102  |  17  ----------------------------<  100<H>  4.6   |  26  |  1.04      Calcium, Total Serum: 8.7 mg/dL (06-30-20 @ 06:08)      LFTs:         Coags:                  --------------------------------------------------------------------------------------    OTHER LABS    IMAGING RESULTS  **************** 51M w L hip pain s/p THR in 03/2019 c/b progressive pain and weakness here for revision L THR 2/26, course c/b lightheadedness with PT    Post-operatively, pt was on IV dilaudid and PRN oxycodone 10mg. Was experiencing lightheadedness intermittently w standing and from standing to sitting position. Denied any accompanying palpitations, chest pain, or dyspnea. Orthostatic BPs saw near 30mmHg drop in SBP from sit to standing position. Yesterday, pain regimen was decreased to max oxycodone 5mg and tramadol. Today, pt worked w PT and states his lightheadedness and 'fuzziness' has improved. States pain is localized in L lateral hip over his incision wo radiation. Currently denies any fever, cough, chest pain, dyspnea. Eating well wo N/V/Abd pain -- although pt does endorse decreased fluid intake in first few days post-op. Urinating wo dysuria. +BMs.     Allergies: NKDA  FHx: pt denies  SH: Never smoker. Rare EtOH. Retired [employer] officer. Lives w wife on [address]      52yo m c/o left hip pain. Patient reports he underwent primary total hip replacement in March of 2019 and has had pain, weakness in his left leg since. Patient reports symptoms have progressively worsened. He ambulates with cane for support. Patient reports failure of conservative management for hip pain including physical therapy, oral analgesics. Patient denies h/o blood clots, use of anticoagulants. He denies fever, chills, cough, SOB, recent sick contacts. Due to worsening pain at the left hip, patient presents today for elective revision left total hip replacement with Dr. Browne. (25 Jun 2020 14:26)      PAST MEDICAL & SURGICAL HISTORY:    Home Meds: Home Medications:  senna oral tablet: 2 tab(s) orally once a day (at bedtime), As needed, Constipation (30 Jun 2020 12:00)    Allergies: Allergies    No Known Allergies    Intolerances      Soc:   Advanced Directives: Presumed Full Code     CURRENT MEDICATIONS:   --------------------------------------------------------------------------------------  Neurologic Medications  acetaminophen   Tablet .. 650 milliGRAM(s) Oral every 6 hours PRN Temp greater or equal to 38C (100.4F), Mild Pain (1 - 3)  HYDROmorphone  Injectable 0.5 milliGRAM(s) IV Push every 15 minutes PRN Severe Pain (7 - 10)  HYDROmorphone  Injectable 0.5 milliGRAM(s) IV Push every 4 hours PRN Breakthrough pain  ondansetron Injectable 4 milliGRAM(s) IV Push every 6 hours PRN Nausea and/or Vomiting  oxyCODONE    IR 5 milliGRAM(s) Oral every 4 hours PRN Severe Pain (7 - 10)  traMADol 50 milliGRAM(s) Oral every 4 hours PRN Moderate Pain (4 - 6)    Respiratory Medications    Cardiovascular Medications    Gastrointestinal Medications  aluminum hydroxide/magnesium hydroxide/simethicone Suspension 30 milliLiter(s) Oral four times a day PRN Indigestion  bisacodyl Suppository 10 milliGRAM(s) Rectal daily PRN If no bowel movement by POD#2  lactated ringers. 1000 milliLiter(s) IV Continuous <Continuous>  magnesium hydroxide Suspension 30 milliLiter(s) Oral daily PRN Constipation  pantoprazole    Tablet 40 milliGRAM(s) Oral before breakfast  polyethylene glycol 3350 17 Gram(s) Oral daily  senna 2 Tablet(s) Oral at bedtime PRN Constipation    Genitourinary Medications    Hematologic/Oncologic Medications  aspirin enteric coated 325 milliGRAM(s) Oral two times a day    Antimicrobial/Immunologic Medications    Endocrine/Metabolic Medications    Topical/Other Medications  BUpivacaine liposome 1.3% Injectable (no eMAR) 20 milliLiter(s) Local Injection once    --------------------------------------------------------------------------------------    VITAL SIGNS, INS/OUTS (last 24 hours):  --------------------------------------------------------------------------------------  ICU Vital Signs Last 24 Hrs  T(C): 37.1 (30 Jun 2020 08:28), Max: 37.1 (30 Jun 2020 08:28)  T(F): 98.7 (30 Jun 2020 08:28), Max: 98.7 (30 Jun 2020 08:28)  HR: 70 (30 Jun 2020 08:28) (70 - 88)  BP: 127/68 (30 Jun 2020 08:28) (118/79 - 165/100)  BP(mean): --  ABP: --  ABP(mean): --  RR: 15 (30 Jun 2020 08:28) (15 - 16)  SpO2: 99% (30 Jun 2020 08:28) (94% - 99%)    I&O's Summary    29 Jun 2020 07:01  -  30 Jun 2020 07:00  --------------------------------------------------------  IN: 780 mL / OUT: 2000 mL / NET: -1220 mL      --------------------------------------------------------------------------------------    EXAM:  GEN: Male in NAD in bed on RA  HEENT: NC/AT, MMM  CV: RRR, nml S1S2, no BLE edema, no tenderness  PULM: Nml effort, CTAB  ABD: Soft, NABS, non-tender  NEURO: Alert, moving all extremities. Decreased dorsiflex/ext strength (4/5) on L side - pt states present prior to admission. Decreased L hip flexion 4/5 2/2 pain  EXT: Dressing over L lateral hip c/d/i    LABS  --------------------------------------------------------------------------------------  Labs:  CAPILLARY BLOOD GLUCOSE                              13.3   7.68  )-----------( 210 ( 30 Jun 2020 06:08 )             39.7         06-30    138  |  102  |  17  ----------------------------<  100<H>  4.6   |  26  |  1.04      Calcium, Total Serum: 8.7 mg/dL (06-30-20 @ 06:08)      LFTs:         Coags:                  --------------------------------------------------------------------------------------    OTHER LABS    IMAGING RESULTS  ****************

## 2020-06-30 NOTE — PROGRESS NOTE ADULT - SUBJECTIVE AND OBJECTIVE BOX
Pt endorses pain in L hip and weakness in L leg secondary to pain. No acute events overnight. Denies CP, SOB, N/V, numbness/tingling     Vital Signs Last 24 Hrs  T(C): 36.6 (30 Jun 2020 04:57), Max: 36.8 (29 Jun 2020 16:20)  T(F): 97.9 (30 Jun 2020 04:57), Max: 98.2 (29 Jun 2020 16:20)  HR: 85 (30 Jun 2020 04:57) (85 - 88)  BP: 147/92 (30 Jun 2020 04:57) (116/85 - 165/100)  RR: 16 (30 Jun 2020 04:57) (15 - 16)  SpO2: 94% (30 Jun 2020 04:57) (94% - 96%)      General: Pt Alert and oriented, NAD  LLE: L hip ishaan DSG C/D/I. SILT. TA/EHL/FHL/GS motors 4-/5, pt states secondary to pain, stable from postop. WWP  RLE: SILT,  EHL/FHL/TA/GS. WWP                          12.9   7.85  )-----------( 199      ( 29 Jun 2020 06:37 )             39.8     06-29    141  |  103  |  19  ----------------------------<  93  4.0   |  29  |  0.98    Ca    8.4      29 Jun 2020 06:37           A/P: 54yMale s/p L ASCENCION revision with change in head and liner 6/26  - Stable  - Pain Control  - DVT ppx: ASA  - PT, WBS: WBAT  - f/u OR cultures-- NGTD 6/30  - dispo home when clears PT    Ortho Pager [phone]

## 2020-07-01 LAB — COBALT SERPL-MCNC: SIGNIFICANT CHANGE UP UG/L (ref 0–0.9)
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
## 2020-07-02 DIAGNOSIS — T84.84XA PAIN DUE TO INTERNAL ORTHOPEDIC PROSTHETIC DEVICES, IMPLANTS AND GRAFTS, INITIAL ENCOUNTER: ICD-10-CM
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
## 2020-07-02 DIAGNOSIS — R42 DIZZINESS AND GIDDINESS: ICD-10-CM

## 2020-07-02 DIAGNOSIS — T40.2X5A ADVERSE EFFECT OF OTHER OPIOIDS, INITIAL ENCOUNTER: ICD-10-CM

## 2020-07-02 LAB
CULTURE RESULTS: NO GROWTH — SIGNIFICANT CHANGE UP
SPECIMEN SOURCE: SIGNIFICANT CHANGE UP

## 2020-07-07 LAB — SURGICAL PATHOLOGY STUDY: SIGNIFICANT CHANGE UP

## 2022-10-20 NOTE — DISCHARGE NOTE NURSING/CASE MANAGEMENT/SOCIAL WORK - PATIENT PORTAL LINK FT
You can access the FollowMyHealth Patient Portal offered by [facility] by registering at the following website: [email or web address]. By joining [employer]’s FollowMyHealth portal, you will also be able to view your health information using other applications (apps) compatible with our system. Rituxan Pregnancy And Lactation Text: This medication is Pregnancy Category C and it isn't know if it is safe during pregnancy. It is unknown if this medication is excreted in breast milk but similar antibodies are known to be excreted.

## 2023-06-30 NOTE — OCCUPATIONAL THERAPY INITIAL EVALUATION ADULT - BALANCE DISTURBANCE, IDENTIFIED IMPAIRMENT CONTRIBUTE, REHAB EVAL
Topical Clindamycin Counseling: Patient counseled that this medication may cause skin irritation or allergic reactions.  In the event of skin irritation, the patient was advised to reduce the amount of the drug applied or use it less frequently.   The patient verbalized understanding of the proper use and possible adverse effects of clindamycin.  All of the patient's questions and concerns were addressed. decreased ROM/pain/decreased strength